# Patient Record
Sex: FEMALE | URBAN - METROPOLITAN AREA
[De-identification: names, ages, dates, MRNs, and addresses within clinical notes are randomized per-mention and may not be internally consistent; named-entity substitution may affect disease eponyms.]

---

## 2019-07-23 ENCOUNTER — APPOINTMENT (OUTPATIENT)
Age: 24
Setting detail: DERMATOLOGY
End: 2019-11-22

## 2019-07-23 DIAGNOSIS — Z41.1 ENCOUNTER FOR COSMETIC SURGERY: ICD-10-CM

## 2019-07-23 PROCEDURE — OTHER CONSULTATION - BREAST (COSMETIC): OTHER

## 2019-07-23 NOTE — PROCEDURE: CONSULTATION - BREAST (COSMETIC)
Send Procedure Quote As Charge: No
Detail Level: Detailed
Consultation Charge $ (Use Numbers Only, No Text Please.): 0

## 2019-07-23 NOTE — HPI: BREAST (BREAST AUGMENTATION CONSULTATION)
Which Breast(S) Are You Concerned About?: bilateral breasts
Is This A New Presentation, Or A Follow-Up?: Breast augmentation consultation
Additional History: Pt states left breast didn’t fully develop. Thought about surgery for awhile.